# Patient Record
Sex: FEMALE | URBAN - METROPOLITAN AREA
[De-identification: names, ages, dates, MRNs, and addresses within clinical notes are randomized per-mention and may not be internally consistent; named-entity substitution may affect disease eponyms.]

---

## 2019-05-21 ENCOUNTER — OFFICE VISIT (OUTPATIENT)
Dept: URBAN - METROPOLITAN AREA CLINIC 88 | Facility: CLINIC | Age: 79
End: 2019-05-21
Payer: MEDICARE

## 2019-05-21 DIAGNOSIS — H11.153 PINGUECULA, BILATERAL: ICD-10-CM

## 2019-05-21 PROCEDURE — 99214 OFFICE O/P EST MOD 30 MIN: CPT | Performed by: OPHTHALMOLOGY

## 2019-05-21 ASSESSMENT — INTRAOCULAR PRESSURE
OD: 12
OS: 13

## 2019-05-21 ASSESSMENT — KERATOMETRY
OD: 45.38
OS: 45.25

## 2019-05-21 ASSESSMENT — VISUAL ACUITY
OD: 20/25
OS: 20/25

## 2019-05-21 NOTE — IMPRESSION/PLAN
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 Plan: Discussed diagnosis in detail with patient. No treatment is required at this time. UV protection recommended.

## 2019-05-21 NOTE — IMPRESSION/PLAN
Impression: Vitreous degeneration, left eye: H43.812. OS. Plan: Discussed signs and symptoms of PVD/floaters. Patient instructed to call the office immediately if any symptoms noted.

## 2020-05-18 ENCOUNTER — OFFICE VISIT (OUTPATIENT)
Dept: URBAN - METROPOLITAN AREA CLINIC 88 | Facility: CLINIC | Age: 80
End: 2020-05-18
Payer: MEDICARE

## 2020-05-18 PROCEDURE — 99214 OFFICE O/P EST MOD 30 MIN: CPT | Performed by: OPHTHALMOLOGY

## 2020-05-18 ASSESSMENT — INTRAOCULAR PRESSURE
OS: 12
OD: 12

## 2020-05-18 ASSESSMENT — KERATOMETRY
OS: 44.88
OD: 45.75

## 2020-05-18 ASSESSMENT — VISUAL ACUITY
OS: 20/25
OD: 20/40

## 2020-05-18 NOTE — IMPRESSION/PLAN
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 Condition: established, stable. Symptoms: will continue to monitor. Plan: Discussed diagnosis in detail with patient. No treatment is required at this time. The patient will monitor vision changes and contact us with any decrease in vision.

## 2020-09-14 ENCOUNTER — OFFICE VISIT (OUTPATIENT)
Dept: URBAN - METROPOLITAN AREA CLINIC 88 | Facility: CLINIC | Age: 80
End: 2020-09-14
Payer: MEDICARE

## 2020-09-14 PROCEDURE — 99213 OFFICE O/P EST LOW 20 MIN: CPT | Performed by: OPHTHALMOLOGY

## 2020-09-14 PROCEDURE — 92136 OPHTHALMIC BIOMETRY: CPT | Performed by: OPHTHALMOLOGY

## 2020-09-14 RX ORDER — PREDNISOLONE ACETATE 10 MG/ML
1 % SUSPENSION/ DROPS OPHTHALMIC
Qty: 1 | Refills: 1 | Status: INACTIVE
Start: 2020-09-14 | End: 2021-02-22

## 2020-09-14 RX ORDER — DICLOFENAC SODIUM 1 MG/ML
0.1 % SOLUTION/ DROPS OPHTHALMIC
Qty: 1 | Refills: 1 | Status: INACTIVE
Start: 2020-09-14 | End: 2021-02-22

## 2020-09-14 RX ORDER — OFLOXACIN 3 MG/ML
0.3 % SOLUTION/ DROPS OPHTHALMIC
Qty: 1 | Refills: 0 | Status: INACTIVE
Start: 2020-09-14 | End: 2021-02-22

## 2020-09-14 ASSESSMENT — KERATOMETRY
OS: 45.50
OD: 45.75

## 2020-09-14 ASSESSMENT — INTRAOCULAR PRESSURE
OD: 11
OS: 11

## 2020-09-14 ASSESSMENT — VISUAL ACUITY
OD: 20/40
OS: 20/50

## 2020-09-14 NOTE — IMPRESSION/PLAN
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 Condition: established, worsening. Vision: vision affected. Plan: Cataracts account for the patient's complaints. Discussed all risks, benefits, procedures and recovery. Patient understands changing glasses will not improve vision. Patient desires to have surgery, recommend phacoemulsification with intraocular lens. Surgical risks and benefits were discussed, explained and to patient. RL2 Schedule Cataract sx OD first/then OS. Final post operative refractive decision will be made by Operative Surgeon.  Pred-Moxi-Nepafenac/generic drops

## 2021-02-22 ENCOUNTER — OFFICE VISIT (OUTPATIENT)
Dept: URBAN - METROPOLITAN AREA CLINIC 88 | Facility: CLINIC | Age: 81
End: 2021-02-22
Payer: MEDICARE

## 2021-02-22 DIAGNOSIS — H25.813 COMBINED FORMS OF AGE-RELATED CATARACT, BILATERAL: Primary | ICD-10-CM

## 2021-02-22 PROCEDURE — 99213 OFFICE O/P EST LOW 20 MIN: CPT | Performed by: OPHTHALMOLOGY

## 2021-02-22 PROCEDURE — 92136 OPHTHALMIC BIOMETRY: CPT | Performed by: OPHTHALMOLOGY

## 2021-02-22 RX ORDER — PREDNISOLONE ACETATE 10 MG/ML
1 % SUSPENSION/ DROPS OPHTHALMIC
Qty: 1 | Refills: 1 | Status: ACTIVE
Start: 2021-02-22

## 2021-02-22 RX ORDER — OFLOXACIN 3 MG/ML
0.3 % SOLUTION/ DROPS OPHTHALMIC
Qty: 1 | Refills: 0 | Status: ACTIVE
Start: 2021-02-22

## 2021-02-22 RX ORDER — DICLOFENAC SODIUM 1 MG/ML
0.1 % SOLUTION/ DROPS OPHTHALMIC
Qty: 1 | Refills: 1 | Status: ACTIVE
Start: 2021-02-22

## 2021-02-22 ASSESSMENT — KERATOMETRY
OD: 45.00
OS: 45.13

## 2021-02-22 ASSESSMENT — INTRAOCULAR PRESSURE
OS: 12
OD: 11

## 2021-02-22 ASSESSMENT — VISUAL ACUITY
OD: 20/50
OS: 20/40

## 2021-02-22 NOTE — IMPRESSION/PLAN
Impression: Combined forms of age-related cataract, bilateral: H25.813. Condition: established, worsening. Vision: vision affected. Plan: Cataracts account for the patient's complaints. Discussed all risks, benefits, procedures and recovery. Patient understands changing glasses will not improve vision. Patient desires to have surgery, recommend phacoemulsification with intraocular lens. Surgical risks and benefits were discussed, explained and to patient. RL2 Schedule Cataract sx OD first/then OS. Final post operative refractive decision will be made by Operative Surgeon.  Pred-Moxi-Nepafenac/generic drops

## 2021-03-16 ENCOUNTER — SURGERY (OUTPATIENT)
Dept: URBAN - METROPOLITAN AREA SURGERY 56 | Facility: SURGERY | Age: 81
End: 2021-03-16
Payer: MEDICARE

## 2021-03-16 PROCEDURE — 66984 XCAPSL CTRC RMVL W/O ECP: CPT | Performed by: OPHTHALMOLOGY

## 2021-03-17 ENCOUNTER — SURGERY (OUTPATIENT)
Dept: URBAN - METROPOLITAN AREA SURGERY 56 | Facility: SURGERY | Age: 81
End: 2021-03-17
Payer: MEDICARE

## 2021-03-18 ENCOUNTER — POST-OPERATIVE VISIT (OUTPATIENT)
Dept: URBAN - METROPOLITAN AREA CLINIC 88 | Facility: CLINIC | Age: 81
End: 2021-03-18

## 2021-03-18 PROCEDURE — 99024 POSTOP FOLLOW-UP VISIT: CPT | Performed by: OPTOMETRIST

## 2021-03-18 ASSESSMENT — INTRAOCULAR PRESSURE: OD: 12

## 2021-03-18 NOTE — IMPRESSION/PLAN
Impression: S/P Cataract Extraction by phacoemulsification with IOL placement OD - 1 Day. Encounter for surgical aftercare following surgery on a sense organ  Z48.810. Plan: RTC if any new problems, pain, redness or vision changes experienced.  --Continue Pred-Moxi-Nepaf

## 2021-03-26 ENCOUNTER — POST-OPERATIVE VISIT (OUTPATIENT)
Dept: URBAN - METROPOLITAN AREA CLINIC 88 | Facility: CLINIC | Age: 81
End: 2021-03-26

## 2021-03-26 DIAGNOSIS — Z48.810 ENCOUNTER FOR SURGICAL AFTERCARE FOLLOWING SURGERY ON A SENSE ORGAN: Primary | ICD-10-CM

## 2021-03-26 PROCEDURE — 99024 POSTOP FOLLOW-UP VISIT: CPT | Performed by: OPHTHALMOLOGY

## 2021-03-26 ASSESSMENT — INTRAOCULAR PRESSURE: OD: 11

## 2021-03-26 ASSESSMENT — VISUAL ACUITY: OD: 20/25

## 2021-03-26 NOTE — IMPRESSION/PLAN
Impression: S/P Cataract Extraction by phacoemulsification with IOL placement OD - 9 Days. Encounter for surgical aftercare following surgery on a sense organ  Z48.810. Plan: Post-op instructions reviewed. Patient to continue with post-operative drop schedule, Pred-Moxi-Nepaf TID OS x1 week, BID OS x1 week, QD OS x1 week. Ed pt to RTC immediately if increase in pain, redness, or decrease in vision occurs.

## 2021-04-19 ENCOUNTER — POST-OPERATIVE VISIT (OUTPATIENT)
Dept: URBAN - METROPOLITAN AREA CLINIC 88 | Facility: CLINIC | Age: 81
End: 2021-04-19

## 2021-04-19 PROCEDURE — 99024 POSTOP FOLLOW-UP VISIT: CPT | Performed by: OPHTHALMOLOGY

## 2021-04-19 ASSESSMENT — VISUAL ACUITY
OS: 20/30-2
OD: 20/20-1

## 2021-04-19 ASSESSMENT — INTRAOCULAR PRESSURE
OD: 14
OS: 14

## 2021-04-19 NOTE — IMPRESSION/PLAN
Impression: S/P Cataract Extraction by phacoemulsification with IOL placement OD - 33 Days. Encounter for surgical aftercare following surgery on a sense organ  Z48.810. Plan: Patient does not want to proceed with Cataract surgery at this time. Recheck 3 months for DE OU w/Dr. Miriam Holloway or Dr. Nuvia Hernandez --Advised patient to use artificial tears for comfort.

## 2021-07-28 ENCOUNTER — OFFICE VISIT (OUTPATIENT)
Dept: URBAN - METROPOLITAN AREA CLINIC 88 | Facility: CLINIC | Age: 81
End: 2021-07-28
Payer: MEDICARE

## 2021-07-28 DIAGNOSIS — H43.393 OTHER VITREOUS OPACITIES, BILATERAL: ICD-10-CM

## 2021-07-28 DIAGNOSIS — Z96.1 PRESENCE OF PSEUDOPHAKIA: ICD-10-CM

## 2021-07-28 DIAGNOSIS — H26.491 OTHER SECONDARY CATARACT, RIGHT EYE: ICD-10-CM

## 2021-07-28 DIAGNOSIS — H35.412 LATTICE DEGENERATION OF RETINA, LEFT EYE: ICD-10-CM

## 2021-07-28 DIAGNOSIS — H52.223 REGULAR ASTIGMATISM, BILATERAL: ICD-10-CM

## 2021-07-28 DIAGNOSIS — H43.812 VITREOUS DETACHMENT OF LEFT EYE: Primary | ICD-10-CM

## 2021-07-28 DIAGNOSIS — H25.812 COMBINED FORMS OF AGE-RELATED CATARACT, LEFT EYE: ICD-10-CM

## 2021-07-28 PROCEDURE — 99213 OFFICE O/P EST LOW 20 MIN: CPT | Performed by: OPTOMETRIST

## 2021-07-28 ASSESSMENT — INTRAOCULAR PRESSURE
OS: 15
OD: 14

## 2021-07-28 ASSESSMENT — KERATOMETRY
OD: 44.88
OS: 45.25

## 2021-07-28 ASSESSMENT — VISUAL ACUITY
OD: 20/20-1
OS: 20/30-2

## 2021-07-28 NOTE — IMPRESSION/PLAN
Impression: Other secondary cataract, right eye: H26.491. Plan: Discussed diagnosis in detail with patient. Surgery not indicated, will monitor.

## 2021-07-28 NOTE — IMPRESSION/PLAN
Impression: Regular astigmatism, bilateral: H52.223. Plan: Refraction performed demonstrating improved vision with glasses. Pt declined rx today. She states she does not feel she needs glasses for distance and is happy with her current glasses for reading.

## 2023-09-05 ENCOUNTER — OFFICE VISIT (OUTPATIENT)
Dept: URBAN - METROPOLITAN AREA CLINIC 88 | Facility: CLINIC | Age: 83
End: 2023-09-05
Payer: MEDICARE

## 2023-09-05 DIAGNOSIS — H11.153 PINGUECULA, BILATERAL: ICD-10-CM

## 2023-09-05 DIAGNOSIS — H25.812 COMBINED FORMS OF AGE-RELATED CATARACT, LEFT EYE: ICD-10-CM

## 2023-09-05 DIAGNOSIS — H43.813 VITREOUS DEGENERATION, BILATERAL: Primary | ICD-10-CM

## 2023-09-05 DIAGNOSIS — Z96.1 PRESENCE OF PSEUDOPHAKIA: ICD-10-CM

## 2023-09-05 PROCEDURE — 99213 OFFICE O/P EST LOW 20 MIN: CPT | Performed by: OPHTHALMOLOGY

## 2023-09-05 ASSESSMENT — KERATOMETRY
OD: 45.25
OS: 45.00

## 2023-09-05 ASSESSMENT — INTRAOCULAR PRESSURE
OD: 12
OS: 13